# Patient Record
(demographics unavailable — no encounter records)

---

## 2022-06-24 RX ORDER — GABAPENTIN 300 MG/1
1 CAPSULE ORAL
COMMUNITY
End: 2022-09-28

## 2022-06-24 RX ORDER — BACLOFEN 10 MG/1
TABLET ORAL
COMMUNITY

## 2022-06-24 RX ORDER — CLONAZEPAM 1 MG/1
TABLET ORAL
COMMUNITY

## 2022-06-24 RX ORDER — LORAZEPAM 0.5 MG/1
TABLET ORAL
COMMUNITY
End: 2022-09-28

## 2022-06-24 RX ORDER — CYCLOBENZAPRINE HCL 10 MG
TABLET ORAL
COMMUNITY
End: 2022-09-28

## 2022-07-29 LAB
APPEARANCE: CLEAR
BILIRUBIN, URINE, POC: NEGATIVE
BLOOD URINE, POC: NEGATIVE
GLUCOSE URINE, POC: NEGATIVE MG/DL
KETONES, URINE, POC: NEGATIVE MG/DL
LEUKOCYTE EST, POC: NEGATIVE
NITRATE, URINE POC: NEGATIVE
PH, URINE, POC: 5.5 (ref 4.5–8)
PROTEIN,URINE, POC: NEGATIVE
SPECIFIC GRAVITY, URINE, POC: >=1.03 (ref 1–1.03)
URINALYSIS COLOR, POC: YELLOW
UROBILIN U POC: 0.2 EU/DL

## 2022-10-21 NOTE — ED NOTES
ED Patient Summary       ;          [facility] [place]  [address], [place], [address] [place] [address]  [phone]  Discharge Instructions (Patient)  Kaylene Abdi  :  1983                   MRN: [id number]                   FIN: [id number]  Reason For Visit: Back pain; EMS LOWER BACK SPASMS  Final Diagnosis: Chronic back pain; Odontalgia     Visit Date: 2022 02:03:00  Address: [address] [place] [address]  Phone: [phone]     Emergency Department Providers:         Primary Physician:      Mariann Fletcher would like to thank you for allowing us to assist you with your healthcare needs. The following includes patient education materials and information regarding your injury/illness. Follow-up Instructions: You were seen today on an emergency basis. Please contact your primary care doctor for a follow up appointment. If you received a referral to a specialist doctor, it is important you follow-up as instructed. It is important that you call your follow-up doctor to schedule and confirm the location of your next appointment. Your doctor may practice at multiple locations. The office location of your follow-up appointment may be different to the one written on your discharge instructions. If you do not have a primary care doctor, please call [phone] for help in finding a [facility] Provider. For help in finding a specialist doctor, please call [phone]. If your condition gets worse before your follow-up with your primary care doctor or specialist, please return to the Emergency Department. Coronavirus 2019 (COVID-19) Reminders:     Patients age 15 - 24, with parental consent, and over age 25 can make an appointment for a COVID-19 vaccine.  Patients can contact their [facility] doctors' offices to schedule an appointment to receive the COVID-19 vaccine. Patients who do not have a [facility] physician can call [phone] to schedule vaccination appointments. Follow Up Appointments:  Primary Care Provider:     Name: PCP,  NONE     Phone:                  With: Address: When:   Caroline Rivero [address] [place] [address] [address]  [phone] Business (1) Within 1 week   Comments: You may return to the emergency department at any time if you have any changes,any concerns or if you worsen. [address] SERVICES%>             Medications that have not changed  Other Medications  amitriptyline Oral (given by mouth) Once a Day (at bedtime). Last Dose:____________________  clonazePAM (clonazePAM 1 mg oral tablet) TAKE 1 TABLET BY MOUTH TWICE A DAY. Last Dose:____________________  HYDROcodone-acetaminophen (HYDROcodone-acetaminophen 5 mg-325 mg oral tablet) 1 Tabs Oral (given by mouth) every 4 hours as needed as needed for pain for 3 Days. Last Dose:____________________  meloxicam (meloxicam 7.5 mg oral tablet) 1 Tabs Oral (given by mouth) every day. Refills: 0. Last Dose:____________________      Allergy Info: PROzac; RisperDAL; Geodon; Haldol; gabapentin     Discharge Additional Information          Discharge Patient 07/29/22 3:53:00 EDT      Patient Education Materials:        Dental Pain      Dental pain may be caused by many things, including:   Tooth decay (cavities or caries). Cavities expose the nerve of your tooth to air and to hot or cold temperatures. This can cause pain or discomfort. Abscess or infection. A dental abscess is a collection of pus from a bacterial infection in the inner part of the tooth (pulp). It usually occurs at the end of the root of a tooth. Injury. An unknown reason (idiopathic). Your pain may be mild or severe. It may occur when you are:   Chewing. Exposed to hot or cold temperatures.      Eating or drinking sugary foods or beverages, such as soda or candy. Your pain may be constant, or it may come and go without cause. Follow these instructions at home:      Watch your dental pain for any changes. The following actions may help to lessen any discomfort that you are feeling:    Medicines     Take over-the-counter and prescription medicines only as told by your health care provider. If you were prescribed an antibiotic medicine, take it as told by your health care provider. Do not stop taking the antibiotic even if you start to feel better. Eating and drinking     Avoid foods or drinks that cause you pain, such as:  ? Very hot or very cold foods or drinks. ? Sweet or sugary foods or drinks. Managing pain and swelling     Apply ice to the painful area of your face:  ? Put ice in a plastic bag.    ? Place a towel between your skin and the bag.    ? Leave the ice on for 20 minutes, 2?3 times a day. Brushing your teeth     To keep your mouth and gums healthy, use fluoride toothpaste to brush your teeth twice a day. Floss once a day. Use a toothpaste made for sensitive teeth if directed by your health care provider. Brush your teeth with a soft-bristled toothbrush. General instructions     Do not apply heat to the outside of your face. Gargle with a salt-water mixture 3?4 times a day or as needed. To make a salt-water mixture, completely dissolve ??1 tsp of salt in 1 cup of warm water. Keep all follow-up visits as told by your health care provider. This is important. Apply ice to the outside of your jaw if there is swelling. Do not put ice directly on the skin. Contact a health care provider if:     Your pain is not controlled with medicines. Your symptoms get worse. You have new symptoms. Get help right away if you:     Are unable to open your mouth. Are having trouble breathing or swallowing. Have a fever. Notice that your face, neck, or jaw is swollen. Summary     Dental pain may be caused by many things, including tooth decay and infection. Your pain may be mild or severe. Take over-the-counter and prescription medicines only as told by your health care provider. Watch your dental pain for any changes. Let your health care provider know if symptoms get worse. This information is not intended to replace advice given to you by your health care provider. Make sure you discuss any questions you have with your health care provider. Document Revised: 04/14/2020 Document Reviewed: 11/08/2018  [employer] Patient Education ? [address] [place].       Chronic Back Pain    When back pain lasts longer than 3 months, it is called chronic back pain. The cause of your back pain may not be known. Some common causes include:   Wear and tear (degenerative disease) of the bones, ligaments, or disks in your back. Inflammation and stiffness in your back (arthritis). People who have chronic back pain often go through certain periods in which the pain is more intense (flare-ups). Many people can learn to manage the pain with home care. Follow these instructions at home:    Pay attention to any changes in your symptoms. Take these actions to help with your pain:    Managing pain and stiffness         If directed, apply ice to the painful area. Your health care provider may recommend applying ice during the first 24?48 hours after a flare-up begins. To do this:  ? Put ice in a plastic bag.    ? Place a towel between your skin and the bag.    ? Leave the ice on for 20 minutes, 2?3 times per day. If directed, apply heat to the affected area as often as told by your health care provider. Use the heat source that your health care provider recommends, such as a moist heat pack or a heating pad.  ? Place a towel between your skin and the heat source. ? Leave the heat on for 20?30 minutes. ? Remove the heat if your skin turns bright red.  This is especially important if you are unable to feel pain, heat, or cold. You may have a greater risk of getting burned. Try soaking in a warm tub. Activity       Avoid bending and other activities that make the problem worse. Maintain a proper position when standing or sitting:  ? When standing, keep your upper back and neck straight, with your shoulders pulled back. Avoid slouching. ? When sitting, keep your back straight and relax your shoulders. Do not round your shoulders or pull them backward. Do not sit or  one place for long periods of time. Take brief periods of rest throughout the day. This will reduce your pain. Resting in a lying or standing position is usually better than sitting to rest.     When you are resting for longer periods, mix in some mild activity or stretching between periods of rest. This will help to prevent stiffness and pain. Get regular exercise. Ask your health care provider what activities are safe for you. Do not lift anything that is heavier than 10 lb (4.5 kg), or the limit that you are told, until your health care provider says that it is safe. Always use proper lifting technique, which includes:  ? Bending your knees. ? Keeping the load close to your body. ? Avoiding twisting. Sleep on a firm mattress in a comfortable position. Try lying on your side with your knees slightly bent. If you lie on your back, put a pillow under your knees. Medicines     Treatment may include medicines for pain and inflammation taken by mouth or applied to the skin, prescription pain medicine, or muscle relaxants. Take over-the-counter and prescription medicines only as told by your health care provider. Ask your health care provider if the medicine prescribed to you:  ? Requires you to avoid driving or using machinery. ? Can cause constipation. You may need to take these actions to prevent or treat constipation:  ?  Drink enough fluid to ---------------------------------------------------------------------------------------------------------------------  [facility] allows patients to review your COVID and other test results as well as discharge documents from any [facility], Emergency Department, surgical center or outpatient lab. Test results are typically available 36 hours after the test is completed. [address] encourages you to self-enroll in the [facility] Patient Portal.     To begin your self-enrollment process, please visit [email or web address] Under [facility], click on Sign up now. NOTE: You must be 16 years and older to use [facility] Self-Enroll online. If you are a parent, caregiver, or guardian; you need an invite to access your childs or dependents health records. To obtain an invite, contact the Medical Records department at [phone] Monday through Friday, 8-4:30, select option 3 . If we receive your call afterhours, we will return your call the next business day. If you have issues trying to create or access your account, contact [employer] at [phone] available 7 days a week 24 hours a day.      Comment:

## 2022-10-21 NOTE — ED NOTES
ED Pre-Arrival Note        Pre-Arrival Summary    Name:  [facility],    Current Date:  7/29/2022 02:05:29 EDT  Gender:    Date of Birth:    Age:  44  Pre-Arrival Type:  EMS  ETA:  7/29/2022 02:25:00 EDT  Primary Care Physician:    Presenting Problem:  Low Back Spasms  Pre-Arrival User:  Dinah GARCIA  Referring Source:    Location:  [place]  BP:  143/86  HR:  121  Resp:  18  O2:  98            PreArrival Communication Form  Emergency Department        Additional Patient Information:        Orders:  [    ] CBC                                            [     ] CT Head no contrast  [    ] BMP                                           [     ] CT Abdomen/Pelvis no contrast  [    ] PT/INR                                       [     ] CT Abdomen/Pelvis IV contrast, w/ oral contrast  [    ] Troponin                                   [     ] CT Abdomen/Pelvis IV contrast, no oral contrast  [    ] BNP                                            [     ] See ordersheet  [    ] CXR                                             [     ] Other:__________________________  [    ] EKG

## 2022-10-21 NOTE — DISCHARGE SUMMARY
ED Clinical Summary                         [facility]  [address], [place], [address] [phone]           PERSON INFORMATION  Name: Babita Gonzalez Age:  44 Years : 1983   Sex: Male Language: English PCP: PCP,  NONE   Marital Status:   Phone: [phone] Med Service: MED-Medicine   MRN:  [id number] Acct# [de-identified] Arrival: 2022 02:03:00   Visit Reason: Back pain; EMS LOWER BACK SPASMS Acuity: 4 LOS: 000 04:17   Address:      [facility] [address] [address]  Diagnosis:      Chronic back pain; Odontalgia  Printed Prescriptions: Allergies      Geodon      RisperDAL      Haldol      PROzac      gabapentin (Unknown)      Medications Administered During Visit:                  Medication Dose Route   ketorolac 15 mg IM   HYDROcodone-acetaminophen 1 tabs Oral       Patient Medication List:              amitriptyline Oral (given by mouth) Once a Day (at bedtime). clonazePAM (clonazePAM 1 mg oral tablet) TAKE 1 TABLET BY MOUTH TWICE A DAY. HYDROcodone-acetaminophen (HYDROcodone-acetaminophen 5 mg-325 mg oral tablet) 1 Tabs Oral (given by mouth) every 4 hours as needed as needed for pain for 3 Days. meloxicam (meloxicam 7.5 mg oral tablet) 1 Tabs Oral (given by mouth) every day. Refills: 0. Major Tests and Procedures: The following procedures and tests were performed during your ED visit. COMMONPROCEDURES%>  COMMON PROCEDURESCOMMENTS%>          Laboratory Orders  Name Status Details   . UA POC Completed Urine, RT, RT - Routine, Collected, 22 2:44:00 EDT, Nurse collect, 22 2:44:00 /[place] [address]                Radiology Orders  No radiology orders were placed.               Patient Care Orders  Name Status Details   Discharge Patient Ordered 22 3:53:00 EDT   ED Assessment Adult Ordered 22 2:09:02 EDT, 22 2:09:02 EDT   ED Secondary Triage Completed 22 2:09:02 EDT, 07/29/22 2:09:02 EDT   ED Triage Adult Completed 07/29/22 2:03:42 EDT, 07/29/22 2:03:42 EDT   NIBP/Pulse Ox Monitoring Completed 07/29/22 2:28:00 EDT, This message can only be seen by Nursing, it is not visible to Pharmacy, Laboratory, or Radiology. , 07/29/22 2:28:00 EDT, 07/29/22 2:28:00 EDT, Once   POC-Urine Dipstick collect Completed 07/29/22 2:28:00 EDT, Once, 07/29/22 2:28:00 EDT             PROVIDER INFORMATION               Provider Role Assigned Pearl Osuna ED Provider 7/29/2022 02:05:21    Yennifer RODRÍGUEZ ED Nurse 7/29/2022 02:13:38        Attending Physician:  Iwona REZA     Admit Doc  Iwona REZA     Consulting Doc       VITALS INFORMATION  Vital Sign Triage Latest   Temp Oral ORAL_1%>36.8 degC ORAL%>36.8 degC   Temp Temporal TEMPORAL_1%> TEMPORAL%>   Temp Intravascular INTRAVASCULAR_1%> INTRAVASCULAR%>   Temp Axillary AXILLARY_1%> AXILLARY%>   Temp Rectal RECTAL_1%> RECTAL%>   02 Sat 100 % 98 %   Respiratory Rate RATE_1%>16 br/min RATE%>18 br/min   Peripheral Pulse Rate PULSE RATE_1%> PULSE RATE%>   Apical Heart Rate HEART RATE_1%> HEART RATE%>   Blood Pressure BLOOD PRESSURE_1%>/ BLOOD PRESSURE_1%>114 mmHg BLOOD PRESSURE%>135 mmHg / BLOOD PRESSURE%>92 mmHg                 Immunizations      No Immunizations Documented This Visit          DISCHARGE INFORMATION   Discharge Disposition: H Outpt-Sent Home   Discharge Location:    Home   Discharge Date and Time:    7/29/2022 06:20:47   ED Checkout Date and Time:    7/29/2022 06:20:47     DEPART REASON INCOMPLETE INFORMATION               Depart Action Incomplete Reason   Interactive View/I&O Recently assessed               Problems      Active           Suicide risk          Tardive dyskinesia          Severe bipolar I disorder, current or most recent episode depressed, in full remission, with catatonia          Tourette disease          Seizures          Schizo affective disorder              Smoking Status      Current every day smoker         PATIENT EDUCATION INFORMATION  Instructions:       Dental Pain; Chronic Back Pain     Follow up:                    With: Address: When:   Marcial Boyer [address]  [phone] Business (1) Within 1 week   Comments: You may return to the emergency department at any time if you have any changes,any concerns or if you worsen. ED PROVIDER DOCUMENTATION     Patient:   Baron Lesch            MRN: [id number]            FIN: [id number]               Age:   44 years     Sex:  Male     :  1983   Associated Diagnoses:   Chronic back pain; Odontalgia   Author:   Cirilo REZA      Basic Information   Additional information: Chief Complaint from Nursing Triage Note   Chief Complaint  Chief Complaint: BIB EMS c/o chronic back pain that is worse tonight after moving boxes around yesterday. Recently had tooth pulled and c/o lft jaw pain. (22 02:06:00). History of Present Illness   Is a 79-year-old black male that arrived by ambulance with a chief complaint of low back pain. Evidently he had walked to a gas station to get some Tylenol for pain and he laid down on the parking lot of the gas station. EMS was called. He has been evaluated for back pain multiple times. He had a normal MRI of his lumbar spine back in 2022. States that he just gets pain in the low back in the center and it feels like somebody is pushing on him back there. He does have a history of significant bipolar disease and states compliance with his medicines. He states he had a recent dental extraction about a week ago. He states he is having still some pain in that left jaw felt to be related to his tardive dyskinesia. He states he is taking the medicines for that as directed. He denies any fever. I smell alcohol and he states he had 1 wine cooler earlier.       Review of Systems   Constitutional symptoms:  No fever,    ENMT symptoms:  Negative except as documented in HPI. Respiratory symptoms:  No shortness of breath,    Cardiovascular symptoms:  No chest pain,    Genitourinary symptoms:  No dysuria,    Musculoskeletal symptoms:  Back pain. Neurologic symptoms:  No incontinence. , no numbness, no focal weakness. Additional review of systems information: All other systems reviewed and otherwise negative. Health Status   Allergies: Allergic Reactions (Selected)  Moderate  Geodon- No reactions were documented. Haldol- No reactions were documented. PROzac- No reactions were documented. RisperDAL- No reactions were documented. Unknown  Gabapentin- Unknown. .      Past Medical/ Family/ Social History   Medical history: Reviewed as documented in chart. Surgical history:    None ([id number]). , Reviewed as documented in chart. Family history: Reviewed as documented in chart. Social history:    Social & Psychosocial Habits    Alcohol  12/24/2017  Use: Current    Frequency: Several times per day    Substance Use  12/10/2016  Use: Denies    Tobacco  08/16/2018  Use: Current every day smoker    Type: Cigarettes    Tobacco use per day: 10  , Reviewed as documented in chart. Problem list:    Active Problems (6)  Schizo affective disorder   Seizures   Severe bipolar I disorder, current or most recent episode depressed, in full remission, with cataton   Suicide risk   Tardive dyskinesia   Tourette disease   . Physical Examination               Vital Signs   Vital Signs   1/68/2356 4:87 EDT Systolic Blood Pressure 744 mmHg    Diastolic Blood Pressure 93 mmHg  HI    Temperature Oral 36.9 degC    Heart Rate Monitored 102 bpm  HI    Respiratory Rate 16 br/min    SpO2 98 %   8/87/6898 6:85 EDT Systolic Blood Pressure 534 mmHg    Diastolic Blood Pressure 791 mmHg  >HHI    Temperature Oral 36.8 degC    Heart Rate Monitored 112 bpm  HI    Respiratory Rate 16 br/min    SpO2 100 %   .    Measurements 7/29/2022 2:09 EDT Body Mass Index est pal 29.10 kg/m2    Body Mass Index Measured 29.10 kg/m2   7/29/2022 2:06 EDT Height/Length Measured 170 cm    Weight Dosing 84.1 kg   . Basic Oxygen Information   7/29/2022 2:06 EDT       SpO2                      100 %  . Repeat pulse at 2:30 AM is 96 on the monitor. .   General:  Alert, no acute distress. Skin:  Warm, dry, pink. Head:  Normocephalic, atraumatic. Neck:  Supple, trachea midline, no tenderness. Eye:  Pupils are equal, round and reactive to light, extraocular movements are intact. Ears, nose, mouth and throat:  Oral mucosa moist, no pharyngeal erythema or exudate, No evidence of significant intraoral infection. .    Cardiovascular:  No murmur, Normal peripheral perfusion, No edema, Regular tachycardia. Respiratory:  Lungs are clear to auscultation, respirations are non-labored. Gastrointestinal:  Soft, Nontender, Non distended, Normal bowel sounds. Back:  Normal range of motion, Normal alignment, no step-offs, Vague tenderness to palpation across his lower lumbar spine increased in the right paraspinal musculature. .    Musculoskeletal:  Normal ROM, normal strength, no tenderness, Negative straight leg raises, Peripheral pulses are 2+ and equal..    Neurological:  Alert and oriented to person, place, time, and situation, No focal neurological deficit observed. Psychiatric:  Cooperative, appropriate mood & affect. Medical Decision Making   Documents reviewed:  Emergency department records. Results review:  Lab results : Lab View   7/29/2022 2:44 EDT Appear U POC Clear    Color U POC Yellow    Bili U POC Negative    Blood U POC Negative    Glucose U POC Negative mg/dL    Ketones U POC Negative mg/dL    Leuk Est U POC Negative    Nitrite U POC Negative    pH U POC 5.5    Protein U POC Negative    Spec Grav U POC >=1.030    Urobilin U POC 0.2 EU/dL   7/29/2022 2:09 EDT Estimated Creatinine Clearance 128. 36 mL/min   , Pulse Oximetry is greater than 94% on room air which is acceptable per my interpretation. Reexamination/ Reevaluation   Vital signs   Basic Oxygen Information   7/29/2022 2:06 EDT       SpO2                      100 %     350 AM: The patient Toradol 15 mg IM currently. He states this helps his back pain. His urine was clean without blood and his vitals have improved. Have also given him 1 Norco orally because he stated he had difficulty sleeping due to the pain. I told him I cannot given this to go home with. He states that the oral Toradol causes him to have more tardive dyskinesia thus I will avoid it. He states he has Klonopin to use at home for anxiety and I told him this may help as a muscle relaxer as well. Impression and Plan   Diagnosis   Chronic back pain (IQH39-FD M54.9, Discharge, Medical)   Odontalgia (JBC98-SB K08.89, Discharge, Medical)   Plan   Condition: Improved, Stable. Disposition: Discharged: Time  7/29/2022 03:52:00, to home. Patient was given the following educational materials: Chronic Back Pain, Dental Pain, Dental Pain, Chronic Back Pain. Follow up with: Elzbieta Bonilla Within 1 week You may return to the emergency department at any time if you have any changes,any concerns or if you worsen. .    Counseled: Patient, Regarding diagnosis, Regarding diagnostic results, Regarding treatment plan, Patient indicated understanding of instructions.

## 2022-10-21 NOTE — ED NOTES
ED Patient Education Note     Patient Education Materials Follows:  Dentistry     Dental Pain      Dental pain may be caused by many things, including:   Tooth decay (cavities or caries). Cavities expose the nerve of your tooth to air and to hot or cold temperatures. This can cause pain or discomfort. Abscess or infection. A dental abscess is a collection of pus from a bacterial infection in the inner part of the tooth (pulp). It usually occurs at the end of the root of a tooth. Injury. An unknown reason (idiopathic). Your pain may be mild or severe. It may occur when you are:   Chewing. Exposed to hot or cold temperatures. Eating or drinking sugary foods or beverages, such as soda or candy. Your pain may be constant, or it may come and go without cause. Follow these instructions at home:      Watch your dental pain for any changes. The following actions may help to lessen any discomfort that you are feeling:    Medicines     Take over-the-counter and prescription medicines only as told by your health care provider. If you were prescribed an antibiotic medicine, take it as told by your health care provider. Do not stop taking the antibiotic even if you start to feel better. Eating and drinking     Avoid foods or drinks that cause you pain, such as:  ? Very hot or very cold foods or drinks. ? Sweet or sugary foods or drinks. Managing pain and swelling     Apply ice to the painful area of your face:  ? Put ice in a plastic bag.    ? Place a towel between your skin and the bag.    ? Leave the ice on for 20 minutes, 2?3 times a day. Brushing your teeth     To keep your mouth and gums healthy, use fluoride toothpaste to brush your teeth twice a day. Floss once a day. Use a toothpaste made for sensitive teeth if directed by your health care provider. Brush your teeth with a soft-bristled toothbrush.       General instructions     Do not apply heat to the outside of your face. Gargle with a salt-water mixture 3?4 times a day or as needed. To make a salt-water mixture, completely dissolve ??1 tsp of salt in 1 cup of warm water. Keep all follow-up visits as told by your health care provider. This is important. Apply ice to the outside of your jaw if there is swelling. Do not put ice directly on the skin. Contact a health care provider if:     Your pain is not controlled with medicines. Your symptoms get worse. You have new symptoms. Get help right away if you:     Are unable to open your mouth. Are having trouble breathing or swallowing. Have a fever. Notice that your face, neck, or jaw is swollen. Summary     Dental pain may be caused by many things, including tooth decay and infection. Your pain may be mild or severe. Take over-the-counter and prescription medicines only as told by your health care provider. Watch your dental pain for any changes. Let your health care provider know if symptoms get worse. This information is not intended to replace advice given to you by your health care provider. Make sure you discuss any questions you have with your health care provider. Document Revised: 04/14/2020 Document Reviewed: 11/08/2018  [employer] Patient Education ? [address]     Chronic Back Pain    When back pain lasts longer than 3 months, it is called chronic back pain. The cause of your back pain may not be known. Some common causes include:   Wear and tear (degenerative disease) of the bones, ligaments, or disks in your back. Inflammation and stiffness in your back (arthritis). People who have chronic back pain often go through certain periods in which the pain is more intense (flare-ups). Many people can learn to manage the pain with home care. Follow these instructions at home:    Pay attention to any changes in your symptoms.  Take these actions to help with your pain:    Managing pain and stiffness         If directed, apply ice to the painful area. Your health care provider may recommend applying ice during the first 24?48 hours after a flare-up begins. To do this:  ? Put ice in a plastic bag.    ? Place a towel between your skin and the bag.    ? Leave the ice on for 20 minutes, 2?3 times per day. If directed, apply heat to the affected area as often as told by your health care provider. Use the heat source that your health care provider recommends, such as a moist heat pack or a heating pad.  ? Place a towel between your skin and the heat source. ? Leave the heat on for 20?30 minutes. ? Remove the heat if your skin turns bright red. This is especially important if you are unable to feel pain, heat, or cold. You may have a greater risk of getting burned. Try soaking in a warm tub. Activity       Avoid bending and other activities that make the problem worse. Maintain a proper position when standing or sitting:  ? When standing, keep your upper back and neck straight, with your shoulders pulled back. Avoid slouching. ? When sitting, keep your back straight and relax your shoulders. Do not round your shoulders or pull them backward. Do not sit or  one place for long periods of time. Take brief periods of rest throughout the day. This will reduce your pain. Resting in a lying or standing position is usually better than sitting to rest.     When you are resting for longer periods, mix in some mild activity or stretching between periods of rest. This will help to prevent stiffness and pain. Get regular exercise. Ask your health care provider what activities are safe for you. Do not lift anything that is heavier than 10 lb (4.5 kg), or the limit that you are told, until your health care provider says that it is safe. Always use proper lifting technique, which includes:  ? Bending your knees. ?  Keeping the load close to your body. ? Avoiding twisting. Sleep on a firm mattress in a comfortable position. Try lying on your side with your knees slightly bent. If you lie on your back, put a pillow under your knees. Medicines     Treatment may include medicines for pain and inflammation taken by mouth or applied to the skin, prescription pain medicine, or muscle relaxants. Take over-the-counter and prescription medicines only as told by your health care provider. Ask your health care provider if the medicine prescribed to you:  ? Requires you to avoid driving or using machinery. ? Can cause constipation. You may need to take these actions to prevent or treat constipation:  ? Drink enough fluid to keep your urine pale yellow. ? Take over-the-counter or prescription medicines. ? Eat foods that are high in fiber, such as beans, whole grains, and fresh fruits and vegetables. ? Limit foods that are high in fat and processed sugars, such as fried or sweet foods. General instructions     Do not use any products that contain nicotine or tobacco, such as cigarettes, e-cigarettes, and chewing tobacco. If you need help quitting, ask your health care provider. Keep all follow-up visits as told by your health care provider. This is important. Contact a health care provider if:     You have pain that is not relieved with rest or medicine. Your pain gets worse, or you have new pain. You have a high fever. You have rapid weight loss. You have trouble doing your normal activities. Get help right away if:     You have weakness or numbness in one or both of your legs or feet. You have trouble controlling your bladder or your bowels. You have severe back pain and have any of the following:  ? Nausea or vomiting. ? Pain in your abdomen. ? Shortness of breath or you faint. Summary     Chronic back pain is back pain that lasts longer than 3 months.      When a flare-up begins, apply ice to the painful area for the first 24?48 hours. Apply a moist heat pad or use a heating pad on the painful area as directed by your health care provider. When you are resting for longer periods, mix in some mild activity or stretching between periods of rest. This will help to prevent stiffness and pain. This information is not intended to replace advice given to you by your health care provider. Make sure you discuss any questions you have with your health care provider. Document Revised: 01/27/2021 Document Reviewed: 01/27/2021  [employer] Patient Education ?  [address] [facility].

## 2022-10-21 NOTE — ED NOTES
ED Triage Note       ED Triage Adult Entered On:  7/29/2022 2:09 EDT    Performed On:  7/29/2022 2:06 EDT by Panda GARCIA               Triage   Numeric Rating Pain Scale :   10 = Worst possible pain   Chief Complaint :   BIB EMS c/o chronic back pain that is worse tonight after moving boxes around yesterday. Recently had tooth pulled and c/o lft jaw pain. [place] ([facility]) Mode of Arrival :   Ambulance   Infectious Disease Documentation :   Document assessment   Temperature Oral :   36.8 degC(Converted to: 98.2 degF)    Heart Rate Monitored :   112 bpm (HI)    Respiratory Rate :   16 br/min   Systolic Blood Pressure :   651 mmHg   Diastolic Blood Pressure :   114 mmHg (>HHI)    SpO2 :   100 %   Patient presentation :   None of the above   Chief Complaint or Presentation suggest infection :   No   Weight Dosing :   84.1 kg(Converted to: 185 lb 7 oz)    Height :   170 cm(Converted to: 5 ft 7 in)    Body Mass Index Dosing :   29 kg/m2   Chanel Mcmullen - 7/29/2022 2:06 EDT   DCP GENERIC CODE   Tracking Acuity :   4   Tracking Group :   ED [facility],  [place] L-RN - 7/29/2022 2:06 EDT   ED General Section :   Document assessment   Pregnancy Status :   N/A   ED Allergies Section :   Document assessment   ED Reason for Visit Section :   Document assessment   ED Quick Assessment :   Patient appears awake, alert, oriented to baseline. Skin warm and dry. Moves all extremities. Respiration even and unlabored. Appears in no apparent distress.    Panda GARCIA - 7/29/2022 2:06 EDT   PTA/Triage Treatments   ED PTA Pre-Arrival Service :   [facility]  [place] L-RN - 7/29/2022 2:06 EDT   ID Risk Screen Symptoms   Recent Travel History :   No recent travel   TB Symptom Screen :   No symptoms   Last 90 days COVID-19 ID :   No   Chanel Mcmullen - 7/29/2022 2:06 EDT   Allergies   (As Of: 7/29/2022 02:09:01 EDT)   Allergies (Active)   gabapentin  Estimated Onset Date: Unspecified ; Reactions:   Unknown ; Created By:   Efrain Granados; Reaction Status:   Active ; Category:   Drug ; Substance:   gabapentin ; Type: Allergy ; Severity:   Unknown ; Updated By:   Jacob ABREU; Reviewed Date:   7/13/2022 16:41 EDT      Geodon  Estimated Onset Date:   Unspecified ; Created Jeremiah Lewis RN, CROW TRUONG; Reaction Status:   Active ; Category:   Drug ; Substance:   Geodon ; Type: Allergy ; Severity:   Moderate ; Updated By:   Aysha Rosenthal; Reviewed Date:   7/13/2022 16:41 EDT      Haldol  Estimated Onset Date:   Unspecified ; Created Jeremiah Lewis RN, CROW TRUONG; Reaction Status:   Active ; Category:   Drug ; Substance:   Haldol ; Type: Allergy ; Severity:   Moderate ; Updated By:   Aysha Rosenthal; Reviewed Date:   7/13/2022 16:41 EDT      PROzac  Estimated Onset Date:   Unspecified ; Created By:   Kandi Corrales RN, AMY E; Reaction Status:   Active ; Category:   Drug ; Substance:   PROzac ; Type: Allergy ; Severity:   Moderate ; Updated By:   Kandi Corrales RN, AMY E; Reviewed Date:   7/13/2022 16:41 EDT      RisperDAL  Estimated Onset Date:   Unspecified ; Created Jeremiah Lewis RN, CROW TRUONG; Reaction Status:   Active ; Category:   Drug ; Substance:   RisperDAL ; Type: Allergy ;  Severity:   Moderate ; Updated By:   Aysha Rosenthal; Reviewed Date:   7/13/2022 16:41 EDT        Psycho-Social   Last 3 mo, thoughts killing self/others :   Patient denies   Right click within box for Suspected Abuse policy link. :   None   Feels Safe Where Live :   Yes   ED Behavioral Activity Rating Scale :   4 - Quiet and awake (normal level of activity)   Nayeli GARCIA - 7/29/2022 2:06 EDT   ED Reason for Visit   (As Of: 7/29/2022 02:09:01 EDT)   Problems(Active)    Schizo affective disorder (SNOMED CT  :[id number] )  Name of Problem:   Schizo affective disorder ; Recorder:   Prakash Cornejo I; Confirmation:   Confirmed ; Classification:   Medical ; Code: [id number] ; Contributor System:   PowerChart ; Last Updated:   11/7/2016 21:39 EST ; Life Cycle Date:   11/7/2016 ; Life Cycle Status:   Active ; Vocabulary:   SNOMED CT        Seizures (IMO  :[id number] )  Name of Problem:   Seizures ; Recorder:   BIANCA NIÑO JOSEPH I; Confirmation:   Confirmed ; Classification:   Medical ; Code:   91279 ; Contributor System:   PowerChart ; Last Updated:   11/7/2016 21:39 EST ; Life Cycle Date:   11/7/2016 ; Life Cycle Status:   Active ; Vocabulary:   IMO        Severe bipolar I disorder, current or most recent episode depressed, in full remission, with catatonia (IMO  :[id number] )  Name of Problem:   Severe bipolar I disorder, current or most recent episode depressed, in full remission, with catatonia ; Recorder:   Angel Bethea; Confirmation:   Confirmed ; Classification:   Medical ; Code:   [id number] ; Contributor System:   PowerChart ; Last Updated:   8/6/2017 22:53 EDT ; Life Cycle Date:   8/6/2017 ; Life Cycle Status:   Active ; Vocabulary:   IMO        Suicide risk (SNOMED CT  :[id number] )  Name of Problem:   Suicide risk ; Recorder:   SYSTEM,  SYSTEM; Confirmation:   Confirmed ; Classification:   Medical ; Code:   [id number] ; Last Updated:   12/23/2020 1:15 EST ; Life Cycle Date:   12/23/2020 ; Life Cycle Status:   Active ; Vocabulary:   SNOMED CT        Tardive dyskinesia (SNOMED CT  :[id number] )  Name of Problem:   Tardive dyskinesia ; Recorder:   Angel Bethea; Confirmation:   Confirmed ; Classification:   Medical ; Code:   [id number] ; Contributor System:   PowerChart ; Last Updated:   8/6/2017 22:53 EDT ;  Life Cycle Date:   8/6/2017 ; Life Cycle Status:   Active ; Vocabulary:   SNOMED CT        Tourette disease (SNOMED CT  :[id number] )  Name of Problem:   Tourette disease ; Recorder:   BIANCA NIÑO JOSEPH I; Confirmation:   Confirmed ; Classification:   Medical ; Code: [id number] ; Contributor System:   [employer] ; Last Updated:   11/7/2016 21:45 EST ;  Life Cycle Date:   11/7/2016 ; Life Cycle Status:   Active ; Vocabulary:   SNOMED CT          Diagnoses(Active)    Back pain  Date:   7/29/2022 ; Diagnosis Type:   Reason For Visit ; Confirmation:   Complaint of ; Clinical Dx:   Back pain ; Classification:   Medical ; Clinical Service:   Emergency medicine ; Code:   PNED ; Probability:   0 ; Diagnosis Code:   [id number]

## 2022-10-21 NOTE — ED NOTES
ED Triage Note       ED Secondary Triage Entered On:  7/29/2022 2:45 EDT    Performed On:  7/29/2022 2:20 EDT by Nohemi RODRÍGUEZ               General Information   Barriers to Learning :   None evident   ED Home Meds Section :   Document assessment   [facility] ED Fall Risk Section :   Document assessment   ED Advance Directives Section :   Document assessment   ED Palliative Screen :   N/A (prefilled for <66yo)   Nohemi RODRÍGUEZ - 7/29/2022 2:44 EDT   (As Of: 7/29/2022 02:45:18 EDT)   Problems(Active)    Schizo affective disorder (SNOMED CT  :[id number] )  Name of Problem:   Schizo affective disorder ; Recorder:   Soumya Guerrero I; Confirmation:   Confirmed ; Classification:   Medical ; Code:   Araseli Lara ; Contributor System:   PowerChart ; Last Updated:   11/7/2016 21:39 EST ; Life Cycle Date:   11/7/2016 ; Life Cycle Status:   Active ; Vocabulary:   SNOMED CT        Seizures (IMO  :[id number] )  Name of Problem:   Seizures ; Recorder:   BIANCA NIÑO JOSEPH I; Confirmation:   Confirmed ; Classification:   Medical ; Code:   05862 ; Contributor System:   PowerChart ; Last Updated:   11/7/2016 21:39 EST ; Life Cycle Date:   11/7/2016 ; Life Cycle Status:   Active ; Vocabulary:   IMO        Severe bipolar I disorder, current or most recent episode depressed, in full remission, with catatonia (IMO  :[id number] )  Name of Problem:   Severe bipolar I disorder, current or most recent episode depressed, in full remission, with catatonia ; Recorder:   Domingo Tenorio; Confirmation:   Confirmed ; Classification:   Medical ; Code:   [id number] ; Contributor System:   PowerChart ; Last Updated:   8/6/2017 22:53 EDT ; Life Cycle Date:   8/6/2017 ; Life Cycle Status:   Active ; Vocabulary:   IMO        Suicide risk (SNOMED CT  :[id number] )  Name of Problem:   Suicide risk ; Recorder:   SYSTEM,  SYSTEM;  Confirmation:   Confirmed ; Classification:   Medical ; Code:   [id number] ; Last Updated: 12/23/2020 1:15 EST ; Life Cycle Date:   12/23/2020 ; Life Cycle Status:   Active ; Vocabulary:   SNOMED CT        Tardive dyskinesia (SNOMED CT  :[id number] )  Name of Problem:   Tardive dyskinesia ; Recorder:   Shanna Francois; Confirmation:   Confirmed ; Classification:   Medical ; Code:   [id number] ; Contributor System:   [employer] ; Last Updated:   8/6/2017 22:53 EDT ; Life Cycle Date:   8/6/2017 ; Life Cycle Status:   Active ; Vocabulary:   SNOMED CT        Tourette disease (SNOMED CT  :[id number] )  Name of Problem:   Tourette disease ; Recorder:   BIANCA NIÑO JOSEPH I; Confirmation:   Confirmed ; Classification:   Medical ; Code:   [id number] ; Contributor System:   [employer] ; Last Updated:   11/7/2016 21:45 EST ;  Life Cycle Date:   11/7/2016 ; Life Cycle Status:   Active ; Vocabulary:   SNOMED CT          Diagnoses(Active)    Back pain  Date:   7/29/2022 ; Diagnosis Type:   Reason For Visit ; Confirmation:   Complaint of ; Clinical Dx:   Back pain ; Classification:   Medical ; Clinical Service:   Emergency medicine ; Code:   PNED ; Probability:   0 ; Diagnosis Code:   [id number]             -    Procedure History   (As Of: 7/29/2022 02:45:18 EDT)     Anesthesia Minutes:   0 ; Procedure Name:   None ; Procedure Minutes:   0            [facility] Fall Risk Assessment Tool   Hx of falling last 3 months ED Fall :   No   Patient confused or disoriented ED Fall :   No   Patient intoxicated or sedated ED Fall :   No   Patient impaired gait ED Fall :   No   Use a mobility assistance device ED Fall :   No   Patient altered elimination ED Fall :   No   [facility] ED Fall Score :   0    Mauricio RODRÍGUEZ - 7/29/2022 2:44 EDT   ED Advance Directive   Advance Directive :   No   Mauricio RODRÍGUEZ - 7/29/2022 2:44 EDT   Med Hx   Medication List   (As Of: 7/29/2022 02:45:18 EDT)   Prescription/Discharge Order    meloxicam  :   meloxicam ; Status:   Prescribed ; Ordered As Mnemonic:   meloxicam 7.5 mg oral tablet ; Simple Display Line:   7.5 mg, 1 tabs, Oral, Daily, 20 tabs, 0 Refill(s) ; Ordering Provider:   Jo FERNANDO; Catalog Code:   meloxicam ; Order Dt/Tm:   4/23/2022 06:33:14 EDT            Home Meds    amitriptyline  :   amitriptyline ; Status:   Documented ; Ordered As Mnemonic:   amitriptyline ; Simple Display Line:   Oral, Once a Day (at bedtime), 0 Refill(s) ; Catalog Code:   amitriptyline ; Order Dt/Tm:   7/13/2022 16:46:12 EDT          HYDROcodone-acetaminophen  :   HYDROcodone-acetaminophen ; Status:   Documented ; Ordered As Mnemonic:   HYDROcodone-acetaminophen 5 mg-325 mg oral tablet ; Simple Display Line:   1 tabs, Oral, q4hr, for 3 days, PRN: as needed for pain, 12 tabs, 0 Refill(s) ; Catalog Code:   HYDROcodone-acetaminophen ; Order Dt/Tm:   9/2/2021 01:27:21 EDT          clonazePAM  :   clonazePAM ; Status:   Documented ; Ordered As Mnemonic:   clonazePAM 1 mg oral tablet ; Simple Display Line:   TAKE 1 TABLET BY MOUTH TWICE A DAY ;  Catalog Code:   clonazePAM ; Order Dt/Tm:   11/30/2018 18:24:57 EST

## 2022-10-21 NOTE — ED PROVIDER NOTES
General Medical Problem *ED        Patient:   Vijay Carr            MRN: [id number]            FIN: [id number]               Age:   44 years     Sex:  Male     :  1983   Associated Diagnoses:   Chronic back pain; Odontalgia   Author:   Renae REZA      Basic Information   Additional information: Chief Complaint from Nursing Triage Note   Chief Complaint  Chief Complaint: BIB EMS c/o chronic back pain that is worse tonight after moving boxes around yesterday. Recently had tooth pulled and c/o lft jaw pain. (22 02:06:00). History of Present Illness   Is a 40-year-old black male that arrived by ambulance with a chief complaint of low back pain. Evidently he had walked to a gas station to get some Tylenol for pain and he laid down on the parking lot of the gas station. EMS was called. He has been evaluated for back pain multiple times. He had a normal MRI of his lumbar spine back in 2022. States that he just gets pain in the low back in the center and it feels like somebody is pushing on him back there. He does have a history of significant bipolar disease and states compliance with his medicines. He states he had a recent dental extraction about a week ago. He states he is having still some pain in that left jaw felt to be related to his tardive dyskinesia. He states he is taking the medicines for that as directed. He denies any fever. I smell alcohol and he states he had 1 wine cooler earlier. Review of Systems   Constitutional symptoms:  No fever,    ENMT symptoms:  Negative except as documented in HPI. Respiratory symptoms:  No shortness of breath,    Cardiovascular symptoms:  No chest pain,    Genitourinary symptoms:  No dysuria,    Musculoskeletal symptoms:  Back pain. Neurologic symptoms:  No incontinence. , no numbness, no focal weakness. Additional review of systems information: All other systems reviewed and otherwise negative. Health Status   Allergies: Allergic Reactions (Selected)  Moderate  Geodon- No reactions were documented. Haldol- No reactions were documented. PROzac- No reactions were documented. RisperDAL- No reactions were documented. Unknown  Gabapentin- Unknown. .      Past Medical/ Family/ Social History   Medical history: Reviewed as documented in chart. Surgical history:    None ([id number]). , Reviewed as documented in chart. Family history: Reviewed as documented in chart. Social history:    Social & Psychosocial Habits    Alcohol  12/24/2017  Use: Current    Frequency: Several times per day    Substance Use  12/10/2016  Use: Denies    Tobacco  08/16/2018  Use: Current every day smoker    Type: Cigarettes    Tobacco use per day: 10  , Reviewed as documented in chart. Problem list:    Active Problems (6)  Schizo affective disorder   Seizures   Severe bipolar I disorder, current or most recent episode depressed, in full remission, with cataton   Suicide risk   Tardive dyskinesia   Tourette disease   . Physical Examination               Vital Signs   Vital Signs   3/48/5150 1:17 EDT Systolic Blood Pressure 273 mmHg    Diastolic Blood Pressure 93 mmHg  HI    Temperature Oral 36.9 degC    Heart Rate Monitored 102 bpm  HI    Respiratory Rate 16 br/min    SpO2 98 %   8/80/6583 5:58 EDT Systolic Blood Pressure 837 mmHg    Diastolic Blood Pressure 342 mmHg  >HHI    Temperature Oral 36.8 degC    Heart Rate Monitored 112 bpm  HI    Respiratory Rate 16 br/min    SpO2 100 %   . Measurements   7/29/2022 2:09 EDT Body Mass Index est pal 29.10 kg/m2    Body Mass Index Measured 29.10 kg/m2   7/29/2022 2:06 EDT Height/Length Measured 170 cm    Weight Dosing 84.1 kg   . Basic Oxygen Information   7/29/2022 2:06 EDT       SpO2                      100 %  . Repeat pulse at 2:30 AM is 96 on the monitor. .   General:  Alert, no acute distress. Skin:  Warm, dry, pink. Head:  Normocephalic, atraumatic.     Neck: Supple, trachea midline, no tenderness. Eye:  Pupils are equal, round and reactive to light, extraocular movements are intact. Ears, nose, mouth and throat:  Oral mucosa moist, no pharyngeal erythema or exudate, No evidence of significant intraoral infection. .    Cardiovascular:  No murmur, Normal peripheral perfusion, No edema, Regular tachycardia. Respiratory:  Lungs are clear to auscultation, respirations are non-labored. Gastrointestinal:  Soft, Nontender, Non distended, Normal bowel sounds. Back:  Normal range of motion, Normal alignment, no step-offs, Vague tenderness to palpation across his lower lumbar spine increased in the right paraspinal musculature. .    Musculoskeletal:  Normal ROM, normal strength, no tenderness, Negative straight leg raises, Peripheral pulses are 2+ and equal..    Neurological:  Alert and oriented to person, place, time, and situation, No focal neurological deficit observed. Psychiatric:  Cooperative, appropriate mood & affect. Medical Decision Making   Documents reviewed:  Emergency department records. Results review:  Lab results : Lab View   7/29/2022 2:44 EDT Appear U POC Clear    Color U POC Yellow    Bili U POC Negative    Blood U POC Negative    Glucose U POC Negative mg/dL    Ketones U POC Negative mg/dL    Leuk Est U POC Negative    Nitrite U POC Negative    pH U POC 5.5    Protein U POC Negative    Spec Grav U POC >=1.030    Urobilin U POC 0.2 EU/dL   7/29/2022 2:09 EDT Estimated Creatinine Clearance 128. 36 mL/min   , Pulse Oximetry is greater than 94% on room air which is acceptable per my interpretation. Reexamination/ Reevaluation   Vital signs   Basic Oxygen Information   7/29/2022 2:06 EDT       SpO2                      100 %     350 AM: The patient Toradol 15 mg IM currently. He states this helps his back pain. His urine was clean without blood and his vitals have improved.   Have also given him 1 Norco orally because he stated he had difficulty sleeping due to the pain. I told him I cannot given this to go home with. He states that the oral Toradol causes him to have more tardive dyskinesia thus I will avoid it. He states he has Klonopin to use at home for anxiety and I told him this may help as a muscle relaxer as well. Impression and Plan   Diagnosis   Chronic back pain ([id number] M54.9, Discharge, Medical)   Odontalgia (GST75-ZC K08.89, Discharge, Medical)   Plan   Condition: Improved, Stable. Disposition: Discharged: Time  7/29/2022 03:52:00, to home. Patient was given the following educational materials: Chronic Back Pain, Dental Pain, Dental Pain, Chronic Back Pain. Follow up with: Eduarda Zayas Within 1 week You may return to the emergency department at any time if you have any changes,any concerns or if you worsen. .    Counseled: Patient, Regarding diagnosis, Regarding diagnostic results, Regarding treatment plan, Patient indicated understanding of instructions.     Signature Line     Electronically Signed on 07/29/2022 03:52 AM EDT   ________________________________________________   Corine REZA               Modified by: Corine REZA on 07/29/2022 03:52 AM EDT